# Patient Record
Sex: FEMALE | Race: WHITE | NOT HISPANIC OR LATINO | Employment: OTHER | URBAN - METROPOLITAN AREA
[De-identification: names, ages, dates, MRNs, and addresses within clinical notes are randomized per-mention and may not be internally consistent; named-entity substitution may affect disease eponyms.]

---

## 2022-05-27 ENCOUNTER — HOSPITAL ENCOUNTER (EMERGENCY)
Facility: HOSPITAL | Age: 77
Discharge: HOME | End: 2022-05-27
Attending: EMERGENCY MEDICINE
Payer: MEDICARE

## 2022-05-27 VITALS
SYSTOLIC BLOOD PRESSURE: 148 MMHG | OXYGEN SATURATION: 97 % | RESPIRATION RATE: 16 BRPM | WEIGHT: 141.13 LBS | HEART RATE: 67 BPM | DIASTOLIC BLOOD PRESSURE: 80 MMHG | TEMPERATURE: 98 F

## 2022-05-27 DIAGNOSIS — S69.92XA FISH HOOK INJURY OF FINGER OF LEFT HAND, INITIAL ENCOUNTER: ICD-10-CM

## 2022-05-27 DIAGNOSIS — M79.5 FOREIGN BODY (FB) IN SOFT TISSUE: Primary | ICD-10-CM

## 2022-05-27 PROCEDURE — 25000003 PHARM REV CODE 250: Mod: ER | Performed by: EMERGENCY MEDICINE

## 2022-05-27 PROCEDURE — 90715 TDAP VACCINE 7 YRS/> IM: CPT | Mod: ER | Performed by: EMERGENCY MEDICINE

## 2022-05-27 PROCEDURE — 99284 EMERGENCY DEPT VISIT MOD MDM: CPT | Mod: ER

## 2022-05-27 PROCEDURE — 90471 IMMUNIZATION ADMIN: CPT | Mod: ER | Performed by: EMERGENCY MEDICINE

## 2022-05-27 PROCEDURE — 63600175 PHARM REV CODE 636 W HCPCS: Mod: ER | Performed by: EMERGENCY MEDICINE

## 2022-05-27 RX ORDER — LIDOCAINE HYDROCHLORIDE 10 MG/ML
10 INJECTION, SOLUTION EPIDURAL; INFILTRATION; INTRACAUDAL; PERINEURAL
Status: COMPLETED | OUTPATIENT
Start: 2022-05-27 | End: 2022-05-27

## 2022-05-27 RX ADMIN — LIDOCAINE HYDROCHLORIDE 100 MG: 10 INJECTION, SOLUTION EPIDURAL; INFILTRATION; INTRACAUDAL at 11:05

## 2022-05-27 RX ADMIN — TETANUS TOXOID, REDUCED DIPHTHERIA TOXOID AND ACELLULAR PERTUSSIS VACCINE, ADSORBED 0.5 ML: 5; 2.5; 8; 8; 2.5 SUSPENSION INTRAMUSCULAR at 11:05

## 2022-05-27 NOTE — ED PROVIDER NOTES
Encounter Date: 5/27/2022       History     Chief Complaint   Patient presents with    Foreign Body in Skin     Fish hook left middle finger       77 y/o F with no significant PMH here with c/o fish hook stuck in left middle finger. This happened just PTA. There is mild pain. Too painful to remove outside of ED. Unsure of last tetanus shot. There is no fever, chills, active bleeding, other injury. Symptoms are constant, mild in severity.         Review of patient's allergies indicates:  No Known Allergies  Past Medical History:   Diagnosis Date    Osteopenia      Past Surgical History:   Procedure Laterality Date    TOTAL ABDOMINAL HYSTERECTOMY      Molar pregnancy     No family history on file.  Social History     Tobacco Use    Smoking status: Never Smoker    Smokeless tobacco: Never Used   Substance Use Topics    Alcohol use: Not Currently    Drug use: Never     Review of Systems   Constitutional: Negative for diaphoresis and fever.   HENT: Negative for congestion, dental problem and sore throat.    Eyes: Negative for pain and visual disturbance.   Respiratory: Negative for cough and shortness of breath.    Cardiovascular: Negative for chest pain and palpitations.   Gastrointestinal: Negative for abdominal pain, diarrhea, nausea and vomiting.   Genitourinary: Negative for dysuria and flank pain.   Musculoskeletal: Negative for back pain and neck pain.   Skin: Positive for wound. Negative for rash.   Neurological: Negative for weakness, numbness and headaches.   Psychiatric/Behavioral: Negative for agitation and confusion.       Physical Exam     Initial Vitals [05/27/22 1101]   BP Pulse Resp Temp SpO2   (!) 202/95 77 16 98.4 °F (36.9 °C) 97 %      MAP       --         Physical Exam    Constitutional: She appears well-developed and well-nourished.   HENT:   Head: Normocephalic and atraumatic.   Eyes: EOM are normal. Pupils are equal, round, and reactive to light.   Neck: Neck supple.   Normal range of  motion.  Cardiovascular: Normal rate and regular rhythm.   Pulmonary/Chest: Breath sounds normal. No respiratory distress.   Abdominal: She exhibits no distension. There is no abdominal tenderness.   Musculoskeletal:         General: No tenderness. Normal range of motion.      Cervical back: Normal range of motion and neck supple.     Neurological: She is alert and oriented to person, place, and time.   Skin: Skin is warm and dry.   There is a fish hook embedded in the mid portion of the left 3rd digit.    Psychiatric: She has a normal mood and affect.         ED Course   Foreign Body    Date/Time: 5/27/2022 11:18 AM  Performed by: Jimmy Johansen MD  Authorized by: Jimmy Johansen MD   Body area: skin  General location: upper extremity  Location details: left long finger  Anesthesia: digital block    Anesthesia:  Local Anesthetic: lidocaine 1% without epinephrine  Anesthetic total: 8 mL    Patient sedated: no  Patient restrained: no  Patient cooperative: yes  Localization method: visualized  Removal mechanism: String.  Dressing: dressing applied  Tendon involvement: none  Depth: subcutaneous  Complexity: simple  1 objects recovered.  Objects recovered: Fish hook  Post-procedure assessment: foreign body removed  Patient tolerance: Patient tolerated the procedure well with no immediate complications      Labs Reviewed - No data to display       Imaging Results    None          Medications   Tdap (BOOSTRIX) vaccine injection 0.5 mL (has no administration in time range)   LIDOcaine (PF) 10 mg/ml (1%) injection 100 mg (100 mg Infiltration Given 5/27/22 1118)                 ED Course as of 05/27/22 1141   Fri May 27, 2022   1119 11:19 AM Reassessment: I reassessed the pt.  The pt is resting comfortably and is NAD.  Pt states their sx have improved. Discussed test results, shared treatment plan, specific conditions for return, and the need for f/u.  Answered their questions at this time.  Pt understands and agrees to the  plan.  The pt has remained hemodynamically stable through ED course and is stable for discharge.    [BA]      ED Course User Index  [BA] Jimmy Johansen MD             Clinical Impression:   Final diagnoses:  [M79.5] Foreign body (FB) in soft tissue (Primary)  [S69.92XA] Fish hook injury of finger of left hand, initial encounter          ED Disposition Condition    Discharge Stable        ED Prescriptions     None        Follow-up Information     Follow up With Specialties Details Why Contact Info    Janet Jones MD Internal Medicine Schedule an appointment as soon as possible for a visit in 2 days For re-evaluation and further treatment [id number]  [facility] [address]  [phone]      [facility] - Emergency Dept Emergency Medicine Go today If symptoms worsen, For re-evaluation and further treatment, As needed [id number] [facility] [address]  [facility] [id number] [phone]           Jimmy Johansen MD  05/31/22 0686

## 2023-05-22 ENCOUNTER — PATIENT MESSAGE (OUTPATIENT)
Dept: RESEARCH | Facility: HOSPITAL | Age: 78
End: 2023-05-22
Payer: MEDICARE